# Patient Record
Sex: FEMALE | Race: WHITE | URBAN - NONMETROPOLITAN AREA
[De-identification: names, ages, dates, MRNs, and addresses within clinical notes are randomized per-mention and may not be internally consistent; named-entity substitution may affect disease eponyms.]

---

## 2021-10-26 ENCOUNTER — OFFICE VISIT (OUTPATIENT)
Dept: URBAN - NONMETROPOLITAN AREA CLINIC 2 | Facility: CLINIC | Age: 46
End: 2021-10-26

## 2022-02-25 ENCOUNTER — OFFICE VISIT (OUTPATIENT)
Dept: URBAN - NONMETROPOLITAN AREA CLINIC 13 | Facility: CLINIC | Age: 47
End: 2022-02-25

## 2022-04-05 ENCOUNTER — OFFICE VISIT (OUTPATIENT)
Dept: URBAN - NONMETROPOLITAN AREA CLINIC 2 | Facility: CLINIC | Age: 47
End: 2022-04-05
Payer: COMMERCIAL

## 2022-04-05 ENCOUNTER — WEB ENCOUNTER (OUTPATIENT)
Dept: URBAN - NONMETROPOLITAN AREA CLINIC 2 | Facility: CLINIC | Age: 47
End: 2022-04-05

## 2022-04-05 VITALS
WEIGHT: 130 LBS | HEART RATE: 81 BPM | TEMPERATURE: 98 F | BODY MASS INDEX: 24.55 KG/M2 | HEIGHT: 61 IN | DIASTOLIC BLOOD PRESSURE: 70 MMHG | SYSTOLIC BLOOD PRESSURE: 120 MMHG

## 2022-04-05 DIAGNOSIS — R19.4 CHANGE IN BOWEL HABIT: ICD-10-CM

## 2022-04-05 DIAGNOSIS — R93.5 ABNORMAL CT OF THE ABDOMEN: ICD-10-CM

## 2022-04-05 DIAGNOSIS — K59.01 SLOW TRANSIT CONSTIPATION: ICD-10-CM

## 2022-04-05 PROCEDURE — 99204 OFFICE O/P NEW MOD 45 MIN: CPT | Performed by: NURSE PRACTITIONER

## 2022-04-05 RX ORDER — CEFDINIR 300 MG/1
CAPSULE ORAL
Qty: 14 | Refills: 0 | Status: ACTIVE | COMMUNITY

## 2022-04-05 RX ORDER — OSELTAMIVIR PHOSPHATE 75 MG/1
CAPSULE ORAL
Qty: 10 | Refills: 0 | Status: ACTIVE | COMMUNITY

## 2022-04-05 RX ORDER — IMIPRAMINE HYDROCHLORIDE 10 MG/1
TABLET, FILM COATED ORAL
Qty: 180 | Status: ACTIVE | COMMUNITY

## 2022-04-05 RX ORDER — SODIUM CHLORIDE 900 MG/100ML
USE AS DIRECTED TWICE DAILY FOR IRRIGATION IRRIGANT IRRIGATION
Qty: 3000 | Refills: 4 | Status: ACTIVE | COMMUNITY

## 2022-04-05 RX ORDER — AZITHROMYCIN 250 MG/1
TABLET, FILM COATED ORAL
Qty: 6 | Refills: 0 | Status: ACTIVE | COMMUNITY

## 2022-04-05 RX ORDER — GABAPENTIN 300 MG/1
CAPSULE ORAL
Qty: 450 | Refills: 0 | Status: ACTIVE | COMMUNITY

## 2022-04-05 RX ORDER — ZINC SULFATE TAB 220 MG (50 MG ZINC EQUIVALENT) 220 (50 ZN) MG
TAB ORAL
Qty: 14 | Refills: 0 | Status: ACTIVE | COMMUNITY

## 2022-04-05 RX ORDER — BACLOFEN 10 MG/1
TABLET ORAL
Qty: 360 | Status: ACTIVE | COMMUNITY

## 2022-04-05 RX ORDER — FLUCONAZOLE 150 MG/1
TABLET ORAL
Qty: 3 | Refills: 1 | Status: ACTIVE | COMMUNITY

## 2022-04-05 RX ORDER — MELOXICAM 7.5 MG/1
TABLET ORAL
Qty: 90 | Status: ACTIVE | COMMUNITY

## 2022-04-05 RX ORDER — DIAZEPAM 5 MG/1
TABLET ORAL
Qty: 90 | Status: ACTIVE | COMMUNITY

## 2022-04-05 RX ORDER — OXYBUTYNIN CHLORIDE 5 MG/1
TABLET ORAL
Qty: 270 | Refills: 2 | Status: ACTIVE | COMMUNITY

## 2022-04-05 RX ORDER — ESOMEPRAZOLE MAGNESIUM 40 MG/1
CAPSULE, DELAYED RELEASE ORAL
Qty: 90 | Refills: 1 | Status: ACTIVE | COMMUNITY

## 2022-04-05 RX ORDER — POLYETHYLENE GLYCOL 3350, SODIUM SULFATE ANHYDROUS, SODIUM BICARBONATE, SODIUM CHLORIDE, POTASSIUM CHLORIDE 236; 22.74; 6.74; 5.86; 2.97 G/4L; G/4L; G/4L; G/4L; G/4L
AS DIRECTED POWDER, FOR SOLUTION ORAL ONCE
Qty: 1 GALLON | Refills: 0 | OUTPATIENT
Start: 2022-04-05 | End: 2022-04-06

## 2022-04-05 RX ORDER — OFLOXACIN OTIC 3 MG/ML
SOLUTION AURICULAR (OTIC)
Qty: 10 | Refills: 0 | Status: ACTIVE | COMMUNITY

## 2022-04-05 RX ORDER — LORATADINE 10 MG
1 PACKET MIXED WITH 8 OUNCES OF FLUID TABLET,DISINTEGRATING ORAL ONCE A DAY
Qty: 30 | OUTPATIENT
Start: 2022-04-05 | End: 2022-05-05

## 2022-04-05 RX ORDER — BIMATOPROST 0.1 MG/ML
SOLUTION/ DROPS OPHTHALMIC
Qty: 2.5 | Status: ACTIVE | COMMUNITY

## 2022-04-05 NOTE — HPI-TODAY'S VISIT:
Patient is a 47-year-old female who been asked to consult on by Dr. Varun King for abdominal pain and constipation.  States that she was having abdominal pain a few months ago.  This is actually resolved.  She does have a history of constipation.  She has cerebral palsy and is wheelchair bound.  She has neurogenic bladder.  She states she is always struggled with constipation, but a few months ago, she developed a fecal impaction a CT scan concerning for stercoral colitis.  Caregiver reports they have been using MiraLAX every other day as well as enema every other day.  Previously the enema was working very well, but recently it does not seem to be as effective.  She did report that she had a bowel movement this morning.  Patient states she has no sensation of BM. Sb

## 2022-04-22 ENCOUNTER — TELEPHONE ENCOUNTER (OUTPATIENT)
Dept: URBAN - NONMETROPOLITAN AREA CLINIC 2 | Facility: CLINIC | Age: 47
End: 2022-04-22

## 2022-05-02 ENCOUNTER — OFFICE VISIT (OUTPATIENT)
Dept: URBAN - METROPOLITAN AREA MEDICAL CENTER 1 | Facility: MEDICAL CENTER | Age: 47
End: 2022-05-02
Payer: COMMERCIAL

## 2022-05-02 DIAGNOSIS — Z12.11 COLON CANCER SCREENING: ICD-10-CM

## 2022-05-02 PROCEDURE — G0121 COLON CA SCRN NOT HI RSK IND: HCPCS | Performed by: INTERNAL MEDICINE

## 2022-05-02 PROCEDURE — G8907 PT DOC NO EVENTS ON DISCHARG: HCPCS | Performed by: INTERNAL MEDICINE

## 2022-05-19 ENCOUNTER — OFFICE VISIT (OUTPATIENT)
Dept: URBAN - NONMETROPOLITAN AREA CLINIC 2 | Facility: CLINIC | Age: 47
End: 2022-05-19

## 2022-06-23 ENCOUNTER — OFFICE VISIT (OUTPATIENT)
Dept: URBAN - NONMETROPOLITAN AREA CLINIC 2 | Facility: CLINIC | Age: 47
End: 2022-06-23

## 2022-08-24 ENCOUNTER — OFFICE VISIT (OUTPATIENT)
Dept: URBAN - NONMETROPOLITAN AREA CLINIC 2 | Facility: CLINIC | Age: 47
End: 2022-08-24
Payer: COMMERCIAL

## 2022-08-24 VITALS
WEIGHT: 137 LBS | HEART RATE: 57 BPM | SYSTOLIC BLOOD PRESSURE: 88 MMHG | TEMPERATURE: 97 F | BODY MASS INDEX: 25.86 KG/M2 | DIASTOLIC BLOOD PRESSURE: 56 MMHG | HEIGHT: 61 IN

## 2022-08-24 DIAGNOSIS — R93.5 ABNORMAL CT OF THE ABDOMEN: ICD-10-CM

## 2022-08-24 DIAGNOSIS — K59.01 SLOW TRANSIT CONSTIPATION: ICD-10-CM

## 2022-08-24 DIAGNOSIS — R19.4 CHANGE IN BOWEL HABIT: ICD-10-CM

## 2022-08-24 PROBLEM — 35298007: Status: ACTIVE | Noted: 2022-04-05

## 2022-08-24 PROCEDURE — 99213 OFFICE O/P EST LOW 20 MIN: CPT | Performed by: NURSE PRACTITIONER

## 2022-08-24 RX ORDER — IMIPRAMINE HYDROCHLORIDE 10 MG/1
TABLET, FILM COATED ORAL
Qty: 180 | Status: ACTIVE | COMMUNITY

## 2022-08-24 RX ORDER — FLUCONAZOLE 150 MG/1
TABLET ORAL
Qty: 3 | Refills: 1 | Status: ACTIVE | COMMUNITY

## 2022-08-24 RX ORDER — OSELTAMIVIR PHOSPHATE 75 MG/1
CAPSULE ORAL
Qty: 10 | Refills: 0 | Status: ACTIVE | COMMUNITY

## 2022-08-24 RX ORDER — OXYBUTYNIN CHLORIDE 5 MG/1
TABLET ORAL
Qty: 270 | Refills: 2 | Status: ACTIVE | COMMUNITY

## 2022-08-24 RX ORDER — GABAPENTIN 300 MG/1
CAPSULE ORAL
Qty: 450 | Refills: 0 | Status: ACTIVE | COMMUNITY

## 2022-08-24 RX ORDER — SODIUM CHLORIDE 900 MG/100ML
USE AS DIRECTED TWICE DAILY FOR IRRIGATION IRRIGANT IRRIGATION
Qty: 3000 | Refills: 4 | Status: ACTIVE | COMMUNITY

## 2022-08-24 RX ORDER — AZITHROMYCIN 250 MG/1
TABLET, FILM COATED ORAL
Qty: 6 | Refills: 0 | Status: ACTIVE | COMMUNITY

## 2022-08-24 RX ORDER — ESOMEPRAZOLE MAGNESIUM 40 MG/1
CAPSULE, DELAYED RELEASE ORAL
Qty: 90 | Refills: 1 | Status: ACTIVE | COMMUNITY

## 2022-08-24 RX ORDER — BACLOFEN 10 MG/1
TABLET ORAL
Qty: 360 | Status: ACTIVE | COMMUNITY

## 2022-08-24 RX ORDER — GLYCERIN PEDIATRIC
1 SUPPOSITORY EVERY OTHER DAY SUPPOSITORY, RECTAL RECTAL ONCE A DAY
Qty: 15 | Refills: 11 | OUTPATIENT
Start: 2022-08-24 | End: 2023-08-19

## 2022-08-24 RX ORDER — BIMATOPROST 0.1 MG/ML
SOLUTION/ DROPS OPHTHALMIC
Qty: 2.5 | Status: ACTIVE | COMMUNITY

## 2022-08-24 RX ORDER — MELOXICAM 7.5 MG/1
TABLET ORAL
Qty: 90 | Status: ACTIVE | COMMUNITY

## 2022-08-24 RX ORDER — CEFDINIR 300 MG/1
CAPSULE ORAL
Qty: 14 | Refills: 0 | Status: ACTIVE | COMMUNITY

## 2022-08-24 RX ORDER — OFLOXACIN OTIC 3 MG/ML
SOLUTION AURICULAR (OTIC)
Qty: 10 | Refills: 0 | Status: ACTIVE | COMMUNITY

## 2022-08-24 RX ORDER — DIAZEPAM 5 MG/1
TABLET ORAL
Qty: 90 | Status: ACTIVE | COMMUNITY

## 2022-08-24 RX ORDER — ZINC SULFATE TAB 220 MG (50 MG ZINC EQUIVALENT) 220 (50 ZN) MG
TAB ORAL
Qty: 14 | Refills: 0 | Status: ACTIVE | COMMUNITY

## 2022-08-24 NOTE — HPI-TODAY'S VISIT:
Patient is a 47-year-old female who been asked to consult on by Dr. Varun King for abdominal pain and constipation.  States that she was having abdominal pain a few months ago.  This is actually resolved.  She does have a history of constipation.  She has cerebral palsy and is wheelchair bound.  She has neurogenic bladder.  She states she is always struggled with constipation, but a few months ago, she developed a fecal impaction a CT scan concerning for stercoral colitis.  Caregiver reports they have been using MiraLAX every other day as well as enema every other day.  Previously the enema was working very well. 2022 Colonoscopy WNL.  Patient states she has no sensation of BM. Sb

## 2022-09-09 ENCOUNTER — TELEPHONE ENCOUNTER (OUTPATIENT)
Dept: URBAN - NONMETROPOLITAN AREA CLINIC 2 | Facility: CLINIC | Age: 47
End: 2022-09-09

## 2022-09-12 ENCOUNTER — TELEPHONE ENCOUNTER (OUTPATIENT)
Dept: URBAN - NONMETROPOLITAN AREA CLINIC 2 | Facility: CLINIC | Age: 47
End: 2022-09-12

## 2022-09-12 RX ORDER — BISACODYL 10 MG/1
1 SUPPOSITORY AS NEEDED SUPPOSITORY RECTAL ONCE A DAY
Qty: 30 | Refills: 11 | OUTPATIENT
Start: 2022-09-12 | End: 2023-09-07

## 2022-09-13 ENCOUNTER — TELEPHONE ENCOUNTER (OUTPATIENT)
Dept: URBAN - NONMETROPOLITAN AREA CLINIC 2 | Facility: CLINIC | Age: 47
End: 2022-09-13

## 2022-09-13 RX ORDER — LINACLOTIDE 145 UG/1
1 CAPSULE AT LEAST 30 MINUTES BEFORE THE FIRST MEAL OF THE DAY ON AN EMPTY STOMACH CAPSULE, GELATIN COATED ORAL ONCE A DAY
Qty: 30 TABLET | Refills: 6 | OUTPATIENT
Start: 2022-09-14 | End: 2023-04-12

## 2023-04-10 ENCOUNTER — ERX REFILL RESPONSE (OUTPATIENT)
Dept: URBAN - NONMETROPOLITAN AREA CLINIC 2 | Facility: CLINIC | Age: 48
End: 2023-04-10

## 2023-04-10 RX ORDER — LINACLOTIDE 145 UG/1
1 CAPSULE AT LEAST 30 MINUTES BEFORE THE FIRST MEAL OF THE DAY ON AN EMPTY STOMACH CAPSULE, GELATIN COATED ORAL ONCE A DAY
Qty: 30 TABLET | Refills: 6 | OUTPATIENT

## 2023-04-10 RX ORDER — LINACLOTIDE 145 UG/1
1 CAPSULE AT LEAST 30 MINUTES BEFORE THE FIRST MEAL OF THE DAY ON AN EMPTY STOMACH ORALLY ONCE A DAY CAPSULE, GELATIN COATED ORAL
Qty: 30 CAPSULE | Refills: 6 | OUTPATIENT

## 2023-11-02 ENCOUNTER — ERX REFILL RESPONSE (OUTPATIENT)
Dept: URBAN - NONMETROPOLITAN AREA CLINIC 2 | Facility: CLINIC | Age: 48
End: 2023-11-02

## 2023-11-02 ENCOUNTER — TELEPHONE ENCOUNTER (OUTPATIENT)
Dept: URBAN - NONMETROPOLITAN AREA CLINIC 2 | Facility: CLINIC | Age: 48
End: 2023-11-02

## 2023-11-02 RX ORDER — LINACLOTIDE 145 UG/1
1 CAPSULE AT LEAST 30 MINUTES BEFORE THE FIRST MEAL OF THE DAY ON AN EMPTY STOMACH ORALLY ONCE A DAY CAPSULE, GELATIN COATED ORAL
Qty: 30 CAPSULE | Refills: 6 | OUTPATIENT

## 2023-11-02 RX ORDER — LINACLOTIDE 145 UG/1
TAKE 1 CAPSULE AT LEAST 30 MINUTES BEFORE THE FIRST MEAL OF THE DAY ON AN EMPTY STOMACH ORALLY ONCE A DAY CAPSULE, GELATIN COATED ORAL
Qty: 30 CAPSULE | Refills: 3 | OUTPATIENT

## 2023-12-18 ENCOUNTER — OFFICE VISIT (OUTPATIENT)
Dept: URBAN - NONMETROPOLITAN AREA CLINIC 2 | Facility: CLINIC | Age: 48
End: 2023-12-18

## 2024-02-08 ENCOUNTER — OV EP (OUTPATIENT)
Dept: URBAN - NONMETROPOLITAN AREA CLINIC 2 | Facility: CLINIC | Age: 49
End: 2024-02-08
Payer: COMMERCIAL

## 2024-02-08 VITALS
SYSTOLIC BLOOD PRESSURE: 118 MMHG | DIASTOLIC BLOOD PRESSURE: 81 MMHG | HEART RATE: 72 BPM | WEIGHT: 137 LBS | BODY MASS INDEX: 25.86 KG/M2 | HEIGHT: 61 IN

## 2024-02-08 DIAGNOSIS — R93.5 ABNORMAL CT OF THE ABDOMEN: ICD-10-CM

## 2024-02-08 DIAGNOSIS — K59.01 SLOW TRANSIT CONSTIPATION: ICD-10-CM

## 2024-02-08 DIAGNOSIS — R19.4 CHANGE IN BOWEL HABIT: ICD-10-CM

## 2024-02-08 PROCEDURE — 99213 OFFICE O/P EST LOW 20 MIN: CPT

## 2024-02-08 RX ORDER — MELOXICAM 7.5 MG/1
TABLET ORAL
Qty: 90 | Status: ACTIVE | COMMUNITY

## 2024-02-08 RX ORDER — LINACLOTIDE 145 UG/1
TAKE 1 CAPSULE AT LEAST 30 MINUTES BEFORE THE FIRST MEAL OF THE DAY ON AN EMPTY STOMACH ORALLY ONCE A DAY CAPSULE, GELATIN COATED ORAL
Qty: 30 CAPSULE | Refills: 3 | Status: ACTIVE | COMMUNITY

## 2024-02-08 RX ORDER — OSELTAMIVIR PHOSPHATE 75 MG/1
CAPSULE ORAL
Qty: 10 | Refills: 0 | Status: ACTIVE | COMMUNITY

## 2024-02-08 RX ORDER — IMIPRAMINE HYDROCHLORIDE 10 MG/1
TABLET ORAL
Qty: 170 TABLET | Status: ACTIVE | COMMUNITY

## 2024-02-08 RX ORDER — OFLOXACIN OTIC 3 MG/ML
SOLUTION AURICULAR (OTIC)
Qty: 10 | Refills: 0 | Status: ACTIVE | COMMUNITY

## 2024-02-08 RX ORDER — OXYBUTYNIN CHLORIDE 5 MG/1
1 TABLET TABLET ORAL
Qty: 360 TABLET | Refills: 2 | Status: ACTIVE | COMMUNITY

## 2024-02-08 RX ORDER — IMIPRAMINE HYDROCHLORIDE 10 MG/1
TABLET, FILM COATED ORAL
Qty: 180 | Status: ACTIVE | COMMUNITY

## 2024-02-08 RX ORDER — ESOMEPRAZOLE MAGNESIUM 40 MG/1
CAPSULE, DELAYED RELEASE ORAL
Qty: 90 | Refills: 1 | Status: ACTIVE | COMMUNITY

## 2024-02-08 RX ORDER — MONTELUKAST SODIUM 10 MG/1
TABLET, COATED ORAL
Qty: 90 TABLET | Status: ACTIVE | COMMUNITY

## 2024-02-08 RX ORDER — GABAPENTIN 300 MG/1
CAPSULE ORAL
Qty: 450 | Refills: 0 | Status: ACTIVE | COMMUNITY

## 2024-02-08 RX ORDER — BIMATOPROST 0.1 MG/ML
SOLUTION/ DROPS OPHTHALMIC
Qty: 2.5 | Status: ACTIVE | COMMUNITY

## 2024-02-08 RX ORDER — BACLOFEN 10 MG/1
TABLET ORAL
Qty: 360 | Status: ACTIVE | COMMUNITY

## 2024-02-08 RX ORDER — BUDESONIDE AND FORMOTEROL FUMARATE DIHYDRATE 160; 4.5 UG/1; UG/1
AEROSOL RESPIRATORY (INHALATION)
Qty: 10.2 GRAM | Refills: 3 | Status: ACTIVE | COMMUNITY

## 2024-02-08 RX ORDER — DIAZEPAM 5 MG/1
TABLET ORAL
Qty: 90 | Status: ACTIVE | COMMUNITY

## 2024-02-08 RX ORDER — SODIUM CHLORIDE 900 MG/100ML
USE AS DIRECTED TWICE DAILY FOR IRRIGATION IRRIGANT IRRIGATION
Qty: 3000 | Refills: 4 | Status: ACTIVE | COMMUNITY

## 2024-02-08 RX ORDER — LINACLOTIDE 290 UG/1
1 CAPSULE AT LEAST 30 MINUTES BEFORE THE FIRST MEAL OF THE DAY ON AN EMPTY STOMACH CAPSULE, GELATIN COATED ORAL ONCE A DAY
Qty: 90 | Refills: 3 | OUTPATIENT
Start: 2024-02-08 | End: 2025-02-02

## 2024-02-08 NOTE — HPI-TODAY'S VISIT:
Patient is a 47-year-old female who been asked to consult on by Dr. Varun King for abdominal pain and constipation.  States that she was having abdominal pain a few months ago.  This is actually resolved.  She does have a history of constipation.  She has cerebral palsy and is wheelchair bound.  She has neurogenic bladder.  She states she is always struggled with constipation, but a few months ago, she developed a fecal impaction a CT scan concerning for stercoral colitis.  Caregiver reports they have been using MiraLAX every other day as well as enema every other day.  Previously the enema was working very well. 2022 Colonoscopy WNL.  Patient states she has no sensation of BM. Sb 2/8/2024 Ju returns to clinic for follow-up with slow transit constipation.  She and her caregiver report that the Linzess 145 mcg daily is working well to help keep regular production of stool.  However she is still using some MiraLAX on a few days during the week.  She is using an enema every other day to stimulate a bowel movement.  They have tried to go without the enemas and this was not productive.  She denies any blood in her stool and abdominal pain is improved with Linzess. Today we discussed increasing to 290 mcg per improved stool production.  They will return and review results with Kanwal Hale NP. SP

## 2024-05-09 ENCOUNTER — OFFICE VISIT (OUTPATIENT)
Dept: URBAN - NONMETROPOLITAN AREA CLINIC 2 | Facility: CLINIC | Age: 49
End: 2024-05-09

## 2024-06-26 ENCOUNTER — OFFICE VISIT (OUTPATIENT)
Dept: URBAN - NONMETROPOLITAN AREA CLINIC 2 | Facility: CLINIC | Age: 49
End: 2024-06-26
Payer: COMMERCIAL

## 2024-06-26 ENCOUNTER — DASHBOARD ENCOUNTERS (OUTPATIENT)
Age: 49
End: 2024-06-26

## 2024-06-26 VITALS
SYSTOLIC BLOOD PRESSURE: 142 MMHG | WEIGHT: 137 LBS | HEIGHT: 61 IN | HEART RATE: 75 BPM | DIASTOLIC BLOOD PRESSURE: 75 MMHG | BODY MASS INDEX: 25.86 KG/M2

## 2024-06-26 DIAGNOSIS — R93.5 ABNORMAL CT OF THE ABDOMEN: ICD-10-CM

## 2024-06-26 DIAGNOSIS — R19.4 CHANGE IN BOWEL HABIT: ICD-10-CM

## 2024-06-26 DIAGNOSIS — K59.01 SLOW TRANSIT CONSTIPATION: ICD-10-CM

## 2024-06-26 PROCEDURE — 99213 OFFICE O/P EST LOW 20 MIN: CPT

## 2024-06-26 RX ORDER — MONTELUKAST SODIUM 10 MG/1
TABLET, COATED ORAL
Qty: 90 TABLET | Status: ACTIVE | COMMUNITY

## 2024-06-26 RX ORDER — OSELTAMIVIR PHOSPHATE 75 MG/1
CAPSULE ORAL
Qty: 10 | Refills: 0 | Status: ACTIVE | COMMUNITY

## 2024-06-26 RX ORDER — IMIPRAMINE HYDROCHLORIDE 10 MG/1
TABLET ORAL
Qty: 170 TABLET | Status: ACTIVE | COMMUNITY

## 2024-06-26 RX ORDER — ESOMEPRAZOLE MAGNESIUM 40 MG/1
CAPSULE, DELAYED RELEASE ORAL
Qty: 90 | Refills: 1 | Status: ACTIVE | COMMUNITY

## 2024-06-26 RX ORDER — BUDESONIDE AND FORMOTEROL FUMARATE DIHYDRATE 160; 4.5 UG/1; UG/1
AEROSOL RESPIRATORY (INHALATION)
Qty: 10.2 GRAM | Refills: 3 | Status: ACTIVE | COMMUNITY

## 2024-06-26 RX ORDER — OXYBUTYNIN CHLORIDE 5 MG/1
1 TABLET TABLET ORAL
Qty: 360 TABLET | Refills: 2 | Status: ACTIVE | COMMUNITY

## 2024-06-26 RX ORDER — LINACLOTIDE 290 UG/1
1 CAPSULE AT LEAST 30 MINUTES BEFORE THE FIRST MEAL OF THE DAY ON AN EMPTY STOMACH CAPSULE, GELATIN COATED ORAL ONCE A DAY
Qty: 90 | Refills: 3 | OUTPATIENT

## 2024-06-26 RX ORDER — DIAZEPAM 5 MG/1
TABLET ORAL
Qty: 90 | Status: ACTIVE | COMMUNITY

## 2024-06-26 RX ORDER — MELOXICAM 7.5 MG/1
TABLET ORAL
Qty: 90 | Status: ACTIVE | COMMUNITY

## 2024-06-26 RX ORDER — SODIUM CHLORIDE 900 MG/100ML
USE AS DIRECTED TWICE DAILY FOR IRRIGATION IRRIGANT IRRIGATION
Qty: 3000 | Refills: 4 | Status: ACTIVE | COMMUNITY

## 2024-06-26 RX ORDER — GABAPENTIN 300 MG/1
CAPSULE ORAL
Qty: 450 | Refills: 0 | Status: ACTIVE | COMMUNITY

## 2024-06-26 RX ORDER — BACLOFEN 10 MG/1
TABLET ORAL
Qty: 360 | Status: ACTIVE | COMMUNITY

## 2024-06-26 RX ORDER — LINACLOTIDE 145 UG/1
TAKE 1 CAPSULE AT LEAST 30 MINUTES BEFORE THE FIRST MEAL OF THE DAY ON AN EMPTY STOMACH ORALLY ONCE A DAY CAPSULE, GELATIN COATED ORAL
Qty: 30 CAPSULE | Refills: 3 | Status: ACTIVE | COMMUNITY

## 2024-06-26 RX ORDER — LINACLOTIDE 290 UG/1
1 CAPSULE AT LEAST 30 MINUTES BEFORE THE FIRST MEAL OF THE DAY ON AN EMPTY STOMACH CAPSULE, GELATIN COATED ORAL ONCE A DAY
Qty: 90 | Refills: 3 | Status: ACTIVE | COMMUNITY
Start: 2024-02-08 | End: 2025-02-02

## 2024-06-26 RX ORDER — BIMATOPROST 0.1 MG/ML
SOLUTION/ DROPS OPHTHALMIC
Qty: 2.5 | Status: ACTIVE | COMMUNITY

## 2024-06-26 RX ORDER — IMIPRAMINE HYDROCHLORIDE 10 MG/1
TABLET, FILM COATED ORAL
Qty: 180 | Status: ACTIVE | COMMUNITY

## 2024-06-26 RX ORDER — OFLOXACIN OTIC 3 MG/ML
SOLUTION AURICULAR (OTIC)
Qty: 10 | Refills: 0 | Status: ACTIVE | COMMUNITY

## 2024-06-26 NOTE — HPI-TODAY'S VISIT:
Patient is a 47-year-old female who been asked to consult on by Dr. Varun King for abdominal pain and constipation.  States that she was having abdominal pain a few months ago.  This is actually resolved.  She does have a history of constipation.  She has cerebral palsy and is wheelchair bound.  She has neurogenic bladder.  She states she is always struggled with constipation, but a few months ago, she developed a fecal impaction a CT scan concerning for stercoral colitis.  Caregiver reports they have been using MiraLAX every other day as well as enema every other day.  Previously the enema was working very well. 2022 Colonoscopy WNL.  Patient states she has no sensation of BM. Sb  2/8/2024 Ju returns to clinic for follow-up with slow transit constipation.  She and her caregiver report that the Linzess 145 mcg daily is working well to help keep regular production of stool.  However she is still using some MiraLAX on a few days during the week.  She is using an enema every other day to stimulate a bowel movement.  They have tried to go without the enemas and this was not productive.  She denies any blood in her stool and abdominal pain is improved with Linzess. Today we discussed increasing to 290 mcg per improved stool production.  They will return and review results with Kanwal Hale NP. SP  6/26/2024 Ju returns for followup. Constipation well controlled.  She nausea/vomiting, dysphagia, abdominal pain, melena, rectal bleeding, weight loss, fever.  Doing well with enemas  with Miralax QOD and daily Linzess 290 mcg. Erroneous DAC is somehow scheduled  She will return in one year.

## 2024-07-09 ENCOUNTER — OFFICE VISIT (OUTPATIENT)
Dept: URBAN - METROPOLITAN AREA SURGERY CENTER 7 | Facility: SURGERY CENTER | Age: 49
End: 2024-07-09

## 2025-06-26 ENCOUNTER — OFFICE VISIT (OUTPATIENT)
Dept: URBAN - NONMETROPOLITAN AREA CLINIC 2 | Facility: CLINIC | Age: 50
End: 2025-06-26
Payer: COMMERCIAL

## 2025-06-26 ENCOUNTER — TELEPHONE ENCOUNTER (OUTPATIENT)
Dept: URBAN - NONMETROPOLITAN AREA CLINIC 2 | Facility: CLINIC | Age: 50
End: 2025-06-26

## 2025-06-26 DIAGNOSIS — K59.01 SLOW TRANSIT CONSTIPATION: ICD-10-CM

## 2025-06-26 DIAGNOSIS — R93.5 ABNORMAL CT OF THE ABDOMEN: ICD-10-CM

## 2025-06-26 DIAGNOSIS — R13.19 OTHER DYSPHAGIA: ICD-10-CM

## 2025-06-26 DIAGNOSIS — R19.4 CHANGE IN BOWEL HABIT: ICD-10-CM

## 2025-06-26 PROCEDURE — 99212 OFFICE O/P EST SF 10 MIN: CPT

## 2025-06-26 RX ORDER — GABAPENTIN 300 MG/1
CAPSULE ORAL
Qty: 450 EACH | Refills: 2 | Status: ACTIVE | COMMUNITY

## 2025-06-26 RX ORDER — BACLOFEN 10 MG/1
TABLET ORAL
Qty: 360 TABLET | Status: ACTIVE | COMMUNITY

## 2025-06-26 RX ORDER — MONTELUKAST 10 MG/1
TABLET, FILM COATED ORAL
Qty: 90 TABLET | Status: ACTIVE | COMMUNITY

## 2025-06-26 RX ORDER — BIMATOPROST 0.1 MG/ML
SOLUTION/ DROPS OPHTHALMIC
Qty: 5 MILLILITER | Status: ACTIVE | COMMUNITY

## 2025-06-26 RX ORDER — LINACLOTIDE 290 UG/1
CAPSULE, GELATIN COATED ORAL
Qty: 90 CAPSULE | Status: ACTIVE | COMMUNITY

## 2025-06-26 RX ORDER — OXYBUTYNIN CHLORIDE 5 MG/1
TABLET ORAL
Qty: 360 EACH | Refills: 2 | Status: ACTIVE | COMMUNITY

## 2025-06-26 RX ORDER — DIAZEPAM 5 MG/1
TABLET ORAL
Qty: 90 EACH | Refills: 2 | Status: ACTIVE | COMMUNITY

## 2025-06-26 RX ORDER — ESOMEPRAZOLE MAGNESIUM 40 MG/1
CAPSULE, DELAYED RELEASE ORAL
Qty: 90 EACH | Refills: 3 | Status: ACTIVE | COMMUNITY

## 2025-06-26 RX ORDER — MELOXICAM 7.5 MG/1
TABLET ORAL
Qty: 90 EACH | Refills: 2 | Status: ACTIVE | COMMUNITY

## 2025-06-26 RX ORDER — IMIPRAMINE HYDROCHLORIDE 10 MG/1
TABLET ORAL
Qty: 180 TABLET | Status: ACTIVE | COMMUNITY

## 2025-06-26 RX ORDER — LINACLOTIDE 290 UG/1
1 CAPSULE AT LEAST 30 MINUTES BEFORE THE FIRST MEAL OF THE DAY ON AN EMPTY STOMACH CAPSULE, GELATIN COATED ORAL ONCE A DAY
Qty: 90 | Refills: 3 | OUTPATIENT
Start: 2025-06-26 | End: 2026-06-21

## 2025-06-26 NOTE — HPI-TODAY'S VISIT:
Patient is a 47-year-old female who been asked to consult on by Dr. Varun King for abdominal pain and constipation.  States that she was having abdominal pain a few months ago.  This is actually resolved.  She does have a history of constipation.  She has cerebral palsy and is wheelchair bound.  She has neurogenic bladder.  She states she is always struggled with constipation, but a few months ago, she developed a fecal impaction a CT scan concerning for stercoral colitis.  Caregiver reports they have been using MiraLAX every other day as well as enema every other day.  Previously the enema was working very well. 2022 Colonoscopy WNL.  Patient states she has no sensation of BM. Sb  2/8/2024 Ju returns to clinic for follow-up with slow transit constipation.  She and her caregiver report that the Linzess 145 mcg daily is working well to help keep regular production of stool.  However she is still using some MiraLAX on a few days during the week.  She is using an enema every other day to stimulate a bowel movement.  They have tried to go without the enemas and this was not productive.  She denies any blood in her stool and abdominal pain is improved with Linzess. Today we discussed increasing to 290 mcg per improved stool production.  They will return and review results with Kanwal Hale NP. SP  6/26/2024 Ju returns for followup. Constipation well controlled.  She denies nausea/vomiting, dysphagia, abdominal pain, melena, rectal bleeding, weight loss, fever.  Doing well with enemas  with Miralax QOD and daily Linzess 290 mcg. Erroneous DAC is somehow scheduled  She will return in one year.  6/26/2025 Patient returns for follow up with constipation , which is currently well managed with enemas, Linzess and Miralax. Denies any brbpr, melena, abdominal pain, unintentional weight loss, early satiety, fever, chills, diarrhea, constipation, , odynophagia, or reflux.  She does have a new issue with trouble swallowing, choking on liquids and solids no pattern identified. She had a hospital admission since last visit for UTI, pneumonia  and has swallow study pending, scheduled. SP